# Patient Record
Sex: FEMALE | Race: WHITE | NOT HISPANIC OR LATINO | Employment: OTHER | URBAN - METROPOLITAN AREA
[De-identification: names, ages, dates, MRNs, and addresses within clinical notes are randomized per-mention and may not be internally consistent; named-entity substitution may affect disease eponyms.]

---

## 2024-06-13 ENCOUNTER — HOSPITAL ENCOUNTER (EMERGENCY)
Facility: HOSPITAL | Age: 84
Discharge: HOME | End: 2024-06-13
Payer: MEDICARE

## 2024-06-13 ENCOUNTER — OFFICE VISIT (OUTPATIENT)
Dept: ORTHOPEDIC SURGERY | Facility: HOSPITAL | Age: 84
End: 2024-06-13
Payer: MEDICARE

## 2024-06-13 ENCOUNTER — HOSPITAL ENCOUNTER (OUTPATIENT)
Dept: RADIOLOGY | Facility: HOSPITAL | Age: 84
Discharge: HOME | End: 2024-06-13
Payer: MEDICARE

## 2024-06-13 ENCOUNTER — APPOINTMENT (OUTPATIENT)
Dept: RADIOLOGY | Facility: HOSPITAL | Age: 84
End: 2024-06-13
Payer: MEDICARE

## 2024-06-13 VITALS
DIASTOLIC BLOOD PRESSURE: 78 MMHG | RESPIRATION RATE: 16 BRPM | HEIGHT: 63 IN | TEMPERATURE: 97.7 F | WEIGHT: 138 LBS | SYSTOLIC BLOOD PRESSURE: 172 MMHG | BODY MASS INDEX: 24.45 KG/M2 | HEART RATE: 70 BPM | OXYGEN SATURATION: 98 %

## 2024-06-13 DIAGNOSIS — S52.501A CLOSED FRACTURE OF DISTAL END OF RIGHT RADIUS, UNSPECIFIED FRACTURE MORPHOLOGY, INITIAL ENCOUNTER: Primary | ICD-10-CM

## 2024-06-13 DIAGNOSIS — M79.671 RIGHT FOOT PAIN: ICD-10-CM

## 2024-06-13 DIAGNOSIS — M25.531 RIGHT WRIST PAIN: ICD-10-CM

## 2024-06-13 DIAGNOSIS — S92.354A NONDISPLACED FRACTURE OF FIFTH METATARSAL BONE, RIGHT FOOT, INITIAL ENCOUNTER FOR CLOSED FRACTURE: ICD-10-CM

## 2024-06-13 DIAGNOSIS — S52.501A CLOSED TRAUMATIC DISPLACED FRACTURE OF DISTAL END OF RIGHT RADIUS, INITIAL ENCOUNTER: Primary | ICD-10-CM

## 2024-06-13 PROCEDURE — 1125F AMNT PAIN NOTED PAIN PRSNT: CPT

## 2024-06-13 PROCEDURE — 99284 EMERGENCY DEPT VISIT MOD MDM: CPT | Mod: 25

## 2024-06-13 PROCEDURE — 2500000004 HC RX 250 GENERAL PHARMACY W/ HCPCS (ALT 636 FOR OP/ED): Performed by: PHYSICIAN ASSISTANT

## 2024-06-13 PROCEDURE — 25605 CLTX DST RDL FX/EPHYS SEP W/: CPT | Mod: RT

## 2024-06-13 PROCEDURE — 99285 EMERGENCY DEPT VISIT HI MDM: CPT | Performed by: PHYSICIAN ASSISTANT

## 2024-06-13 PROCEDURE — 96374 THER/PROPH/DIAG INJ IV PUSH: CPT | Mod: 59

## 2024-06-13 PROCEDURE — 99214 OFFICE O/P EST MOD 30 MIN: CPT

## 2024-06-13 PROCEDURE — 99204 OFFICE O/P NEW MOD 45 MIN: CPT

## 2024-06-13 PROCEDURE — L4361 PNEUMA/VAC WALK BOOT PRE OTS: HCPCS

## 2024-06-13 PROCEDURE — 73630 X-RAY EXAM OF FOOT: CPT | Mod: RIGHT SIDE | Performed by: RADIOLOGY

## 2024-06-13 PROCEDURE — 1160F RVW MEDS BY RX/DR IN RCRD: CPT

## 2024-06-13 PROCEDURE — 73110 X-RAY EXAM OF WRIST: CPT | Mod: RIGHT SIDE | Performed by: RADIOLOGY

## 2024-06-13 PROCEDURE — 1159F MED LIST DOCD IN RCRD: CPT

## 2024-06-13 PROCEDURE — 73110 X-RAY EXAM OF WRIST: CPT | Mod: RT

## 2024-06-13 PROCEDURE — 73630 X-RAY EXAM OF FOOT: CPT | Mod: RT

## 2024-06-13 PROCEDURE — 1123F ACP DISCUSS/DSCN MKR DOCD: CPT

## 2024-06-13 RX ORDER — MORPHINE SULFATE 4 MG/ML
4 INJECTION INTRAVENOUS ONCE
Status: COMPLETED | OUTPATIENT
Start: 2024-06-13 | End: 2024-06-13

## 2024-06-13 RX ORDER — INSULIN LISPRO 100 [IU]/ML
INJECTION, SOLUTION SUBCUTANEOUS 3 TIMES DAILY
COMMUNITY
Start: 2023-07-26

## 2024-06-13 RX ORDER — METOPROLOL SUCCINATE 50 MG/1
1 TABLET, EXTENDED RELEASE ORAL EVERY EVENING
COMMUNITY
Start: 2024-01-12

## 2024-06-13 RX ORDER — ROSUVASTATIN CALCIUM 10 MG/1
1 TABLET, COATED ORAL NIGHTLY
COMMUNITY
Start: 2024-05-14 | End: 2025-05-14

## 2024-06-13 RX ORDER — OXYCODONE AND ACETAMINOPHEN 5; 325 MG/1; MG/1
1 TABLET ORAL EVERY 8 HOURS PRN
Qty: 21 TABLET | Refills: 0 | Status: SHIPPED | OUTPATIENT
Start: 2024-06-13 | End: 2024-06-20

## 2024-06-13 RX ORDER — BISMUTH SUBSALICYLATE 262 MG
1 TABLET,CHEWABLE ORAL EVERY MORNING
COMMUNITY

## 2024-06-13 RX ORDER — LOSARTAN POTASSIUM 25 MG/1
1 TABLET ORAL EVERY MORNING
COMMUNITY
Start: 2024-01-12

## 2024-06-13 RX ORDER — CHOLECALCIFEROL (VITAMIN D3) 25 MCG
1-2 TABLET ORAL EVERY MORNING
COMMUNITY

## 2024-06-13 RX ORDER — INSULIN DEGLUDEC 100 U/ML
9 INJECTION, SOLUTION SUBCUTANEOUS EVERY MORNING
COMMUNITY
Start: 2023-11-28

## 2024-06-13 RX ORDER — MULTIVIT WITH MINERALS/HERBS
1 TABLET ORAL EVERY MORNING
COMMUNITY

## 2024-06-13 RX ORDER — LIDOCAINE HYDROCHLORIDE 10 MG/ML
10 INJECTION INFILTRATION; PERINEURAL ONCE
Status: DISCONTINUED | OUTPATIENT
Start: 2024-06-13 | End: 2024-06-13 | Stop reason: HOSPADM

## 2024-06-13 RX ADMIN — MORPHINE SULFATE 4 MG: 4 INJECTION, SOLUTION INTRAMUSCULAR; INTRAVENOUS at 18:54

## 2024-06-13 ASSESSMENT — COLUMBIA-SUICIDE SEVERITY RATING SCALE - C-SSRS
1. IN THE PAST MONTH, HAVE YOU WISHED YOU WERE DEAD OR WISHED YOU COULD GO TO SLEEP AND NOT WAKE UP?: NO
6. HAVE YOU EVER DONE ANYTHING, STARTED TO DO ANYTHING, OR PREPARED TO DO ANYTHING TO END YOUR LIFE?: NO
2. HAVE YOU ACTUALLY HAD ANY THOUGHTS OF KILLING YOURSELF?: NO

## 2024-06-13 ASSESSMENT — PAIN SCALES - GENERAL
PAINLEVEL_OUTOF10: 9
PAINLEVEL_OUTOF10: 9

## 2024-06-13 ASSESSMENT — PAIN - FUNCTIONAL ASSESSMENT
PAIN_FUNCTIONAL_ASSESSMENT: 0-10
PAIN_FUNCTIONAL_ASSESSMENT: 0-10

## 2024-06-13 NOTE — ED TRIAGE NOTES
Pt had a mechanical fall from standing and injured her R wrist and foot. Went to urgent care where fractures were confirmed. Has a walking boot on and is able to ambulate with a steady gait. Urgent care stated wrist needs to be reduced and they were unsuccessful in doing so. Pt is on eliquis, did not hit her head. Neuro intact

## 2024-06-13 NOTE — ED PROVIDER NOTES
HPI   Chief Complaint   Patient presents with    Fall       HPI: Patient is a 89-year-old female with a history of hypertension, hyperlipidemia, A-fib on Eliquis who presents to the ED for wrist and foot injury that occurred prior to arrival.  Patient states that she was on an escalator earlier today when she went to turn to look at her family member and she twisted onto her right side landing onto her right wrist and right foot.  She was seen at urgent care where she was found to have a nondisplaced ankle fracture and a displaced right wrist fracture.  They were unable to reduce the wrist in the urgent care so sent to the ED.  Patient denies any head injury, loss of consciousness.  Denies any neck or back pain.  Rates her wrist pain a 9 out of 10 in severity.    ------------------------------------------------------------------------------------------------------------------------------------------  ROS: a ten point review of systems was performed and was negative except as per HPI.  ------------------------------------------------------------------------------------------------------------------------------------------  PMH / PSH: as per HPI, otherwise reviewed   MEDS: as per HPI, otherwise reviewed in EMR  ALLERGIES: as per HPI, otherwise reviewed in EMR  SocH:  as per HPI, otherwise reviewed in EMR  FH:  as per HPI, otherwise reviewed in EMR   ------------------------------------------------------------------------------------------------------------------------------------------  Physical Exam:  VS: As documented in the triage note and EMR flowsheet from this visit was reviewed  General: Well appearing. No acute distress.   Eyes:  Extraocular movements grossly intact. No scleral icterus.   Head: Atraumatic. Normocephalic.   No Mcnally sign, no raccoon eyes.  No hemotympanum.  Neck: No meningismus. No gross masses. Full movement through range of motion  ENT: Posterior oropharynx shows no erythema, exudate or  edema.  Uvula is midline without edema.  No stridor or trismus  CV: Regular rhythm. No murmurs, rubs, gallops appreciated.   Resp: Clear to auscultation bilaterally. No respiratory distress.    MSK: Symmetric muscle bulk. No gross step offs or deformities.  No midline tenderness of the cervical, thoracic or lumbar spine.  Boot in place to the right foot.  Skin: Warm, dry. No rashes  Neuro: CN II-VII intact. A&O x3. Speech fluent. Alert. Moving all extremities. Ambulates with normal gait  Psych: Appropriate mood and affect for situation  ------------------------------------------------------------------------------------------------------------------------------------------  Hospital Course / Medical Decision Making: Patient is a 89-year-old female who presents to the ED for right ankle and right wrist fractures.  States that she was on an escalator and went to turn and fell landing on her right side.  She was seen in urgent care where she was found to have a displaced right wrist fracture and nondisplaced right ankle fracture.  She denies any head injury.  On examination she has no external signs of trauma or basilar skull fracture on examination.  No tenderness to the midline of the cervical, thoracic or lumbar spine.  Orthopedics consulted who came and reduced the patient's right wrist without difficulty.  Recommended discharging the patient with pain medication.  Patient is from [place] and will follow-up there. Patient has remained hemodynamically stable throughout the course of their ED stay.  Patient is home-going.  Patient advised to return to the ED for any worsening symptoms.  Advised to follow-up with PCP.  Patient was discharged in stable condition.                              [place] Coma Scale Score: 15                     Patient History   No past medical history on file.  Past Surgical History:   Procedure Laterality Date    CT ANGIO HEART CORONARY  11/26/2021    CT HEART CORONARY ANGIOGRAM  11/26/2021 [facility]     No family history on file.  Social History     Tobacco Use    Smoking status: Not on file    Smokeless tobacco: Not on file   Substance Use Topics    Alcohol use: Not on file    Drug use: Not on file       Physical Exam   ED Triage Vitals [06/13/24 1706]   Temperature Heart Rate Respirations BP   36.5 °C (97.7 °F) 70 16 172/78      Pulse Ox Temp src Heart Rate Source Patient Position   98 % -- -- --      BP Location FiO2 (%)     -- --       Physical Exam    ED Course & MDM   Diagnoses as of 06/13/24 2048   Closed fracture of distal end of right radius, unspecified fracture morphology, initial encounter       Medical Decision Making      Procedure  Procedures     Chiara Merritt PA-C  06/13/24 2049

## 2024-06-14 NOTE — CONSULTS
Orthopaedic Surgery Consult H&P    HPI:   Orthopaedic Problems/Injuries: Right distal radius fracture  Other Injuries: Right fifth metatarsal fracture    84 y.o. female PMH type 1 diabetes, A-fib on Eliquis presents after ground-level fall while getting off an escalator sustaining above. Denies numbness, tingling, and open wounds on the affected limb.  No significant pain and swelling about the right wrist.  Denies previous injuries to this extremity.    PMH: per above/EMR  PSH: per above/EMR  SocHx:      -  Denies tobacco use  FamHx:  Non-contributory to this patient's acute orthopaedic problem.   Allergies: Reviewed in EMR  Meds: Reviewed in EMR    ROS      - 14 point ROS negative except as above    Physical Exam:  Gen: AOx3, NAD  HEENT: normocephalic atraumatic  Psych: appropriate mood and affect  Resp: nonlabored breathing  Cardiac: Extremities WWP, RRR to peripheral palpation  Neuro: CN 2-12 grossly intact  Skin: no rashes    Right hand:  - Skin: intact  - Painful at site of injury  - SILT M/U/R  - RoM: Limited by pain  - Fires AIN/PIN/Ulnar distributions  - Fingertips pink/warm, cap refill < 2sec  - 2+ radial pulse  - Hand and Forearm compartments compressible    A full secondary exam was performed and all relevant findings discussed and noted above.    Imaging:  AP and lateral radiographs of the right wrist display a dorsally displaced intra-articular distal radius fracture with an associated ulnar styloid fracture.    Postreduction AP and lateral radiographs of the right wrist demonstrate improved alignment of the distal radius and ulnar styloid fractures with interval splinting.    Assessment:  Orthopaedic Problems/Injuries: Right distal radius fracture    We discussed natural history of these injuries as well as the treatment options including nonoperative treatment with splinting in situ versus closed reduction and splinting as well as operative intervention.  Recommendation was made for nonoperative  treatment and patient would like to undergo a closed reduction.  Verbal consent was obtained from the patient, he received 4 mg of IV morphine per the emergency department for pain control.  A 10 mL 1% lidocaine hematoma block for additional pain control was performed.  The right upper extremity was then closed reduced and placed in a sugar-tong splint.  She tolerated this well.    Plan:  -No acute orthopedic operative intervention indicated  - WB: Nonweightbearing right upper extremity and sugar-tong splint and sling  - Abx: Not indicated  - DVT: None indicated for this injury, may continue her home Eliquis  -Pain control per the emergency department    - Dispo: Home. Patient lives in [place] and plans to return home on 6/19. Recommended calling her local health system and arranging appointment with [facility] provider for about 2 weeks from today.    This plan was discussed with Dr. Coates.    Kel Trejo MD  PGY-4 Orthopaedic Surgery  On-call Resident    This patient was seen and staffed within 30 minutes of initial consult.

## 2024-06-16 NOTE — PROGRESS NOTES
PRIMARY CARE PHYSICIAN: Jarred Dove MD  REFERRING PROVIDER: No referring provider defined for this encounter.     CONSULT ORTHOPAEDIC: Hand and Wrist Evaluation    ASSESSMENT & PLAN    Impression: 84 y.o. female with an acute ground-level fall resulting in a displaced right distal radius fracture as well as a nondisplaced comminuted midshaft fifth metatarsal fracture of the right foot    Plan:   I explained to the patient the nature of their diagnosis.  I reviewed their imaging studies with them.    Discussed treatment options with patient and her son today in regards to management of patient's displaced distal radius fracture including splinting and referral to hand/wrist specialist for definitive care versus transfer to [facility] ED (after discussion with Dr. Coates), for closed reduction and splinting today. Patient and son elected to go forward with transfer to [facility] ED for closed reduction of the displaced distal radius fracture. Patient was placed on Dr. Coates's schedule for Monday for close follow-up.     With regard to the fifth metatarsal fracture, the patient was placed in a short fracture boot which she may be ambulating weight bearing as tolerated.     Patient was prescribed a CAM walker boot for nondisplaced comminuted fifth metatarsal fracture. The patient is ambulatory with or without aid; but, has weakness, instability and/or deformity of their right ankle/foot which requires stabilization from this orthosis to improve their function.      Verbal and written instructions for the use, wear schedule, cleaning and application of this item were given.  Patient was instructed that should the brace result in increased pain, decreased sensation, increased swelling, or an overall worsening of their medical condition, to please contact our office immediately.     Orthotic management and training was provided for skin care, modifications due to healing tissues, edema changes, interruption in skin  integrity, and safety precautions with the orthosis.      At the end of the visit, all questions were answered in full. The patient is in agreement with the plan and recommendations. They will call the office with any questions/concerns.    Note dictated with Flexiroam software. Completed without full typed error editing and sent to avoid delay.     SUBJECTIVE  CHIEF COMPLAINT:   Chief Complaint   Patient presents with    Right Wrist - Pain        HPI: Valentina Cole is a 84 y.o. patient who presents today to the orthopedic walk-in clinic with right wrist and foot pain. Pain started approximately one hour ago when she fell while stepping off an escalator. Patient reports she turned the wrong way while exiting the escalator and subsequently fell onto the right wrist and foot. Pain is localized to the distal radius and ulnar styloid as well as the fifth metatarsal of the right foot. Pain is rated a 9/10 on the pain scale.  Patient is accompanied by her son.     They deny any associated neck pain.  No numbness, tingling, or paresthesias.    REVIEW OF SYSTEMS  Constitutional: See HPI for pain assessment, No significant recent weight gain or loss, no fever or chills  Cardiovascular: No chest pain, shortness of breath  Respiratory: No difficulty breathing, no cough  Gastrointestinal: No nausea, vomiting, diarrhea, constipation  Musculoskeletal: Noted in HPI, positive for pain, restricted motion, stiffness  Integumentary: No rashes, easy bruising or skin lesions  Neurological: No headache, no numbness or tingling in extremities  Psychiatric: No mood changes or memory changes. No social issues  Heme/Lymph: No excessive swelling, easy bruising or excessive bleeding  ENT: No hearing changes, no nosebleeds  Eyes: No vision changes    No past medical history on file.     Allergies   Allergen Reactions    Ciprofloxacin Hcl Anaphylaxis and Swelling     leg swelling    Sulfa (Sulfonamide Antibiotics) Other         Past Surgical History:   Procedure Laterality Date    CT ANGIO HEART CORONARY  11/26/2021    CT HEART CORONARY ANGIOGRAM 11/26/2021 [facility] ANCILLARY LEGAC[facility]        No family history on file.     Social History     Socioeconomic History    Marital status:      Spouse name: Not on file    Number of children: Not on file    Years of education: Not on file    Highest education level: Not on file   Occupational History    Not on file   Tobacco Use    Smoking status: Not on file    Smokeless tobacco: Not on file   Substance and Sexual Activity    Alcohol use: Not on file    Drug use: Not on file    Sexual activity: Not on file   Other Topics Concern    Not on file   Social History Narrative    Not on file     Social Determinants of Health     Financial Resource Strain: Not on file   Food Insecurity: Not on file   Transportation Needs: Not on file   Physical Activity: Not on file   Stress: Not on file   Social Connections: Not on file   Intimate Partner Violence: Unknown (12/4/2023)    Received from [facility] [place]    OB Intimate Partner Violence     : 1     : 1     : Not on file   Housing Stability: Not At Risk (12/4/2023)    Received from [facility] [place]    OB Housing Stability     Fear of Current or Ex-Partner: Not on file     Emotionally Abused: Not on file     Physically Abused: Not on file     Sexually Abused: Not on file        CURRENT MEDICATIONS:   Current Outpatient Medications   Medication Sig Dispense Refill    apixaban (Eliquis) 2.5 mg tablet Take 1 tablet (2.5 mg) by mouth 2 times a day.      cholecalciferol (Vitamin D-3) 25 MCG (1000 UT) tablet Take 1-2 tablets (25-50 mcg) by mouth once daily in the morning.      dronedarone (Multaq) 400 mg tablet Take 1 tablet (400 mg) by mouth 2 times a day.      HumaLOG Tonny KwikPen U-100 100 unit/mL injection Inject under the skin 3 times a day. Per sliding scale      losartan (Cozaar) 25 mg tablet Take 1 tablet (25 mg) by mouth once  "daily in the morning.      metoprolol succinate XL (Toprol-XL) 50 mg 24 hr tablet Take 1 tablet (50 mg) by mouth once daily in the evening.      multivitamin tablet Take 1 tablet by mouth once daily in the morning.      oxyCODONE-acetaminophen (Percocet) 5-325 mg tablet Take 1 tablet by mouth every 8 hours if needed for severe pain (7 - 10) for up to 7 days. 21 tablet 0    rosuvastatin (Crestor) 10 mg tablet Take 1 tablet (10 mg) by mouth once daily at bedtime.      Tresiba FlexTouch U-100 100 unit/mL (3 mL) injection Inject 9 Units under the skin once daily in the morning.      vitamin B complex tablet Take 1 tablet by mouth once daily in the morning.       No current facility-administered medications for this visit.        OBJECTIVE    PHYSICAL EXAM      11/23/2021    10:28 AM 6/13/2024     5:06 PM   Vitals   Systolic 165 172   Diastolic 63 78   Heart Rate 63 70   Temp  36.5 °C (97.7 °F)   Resp  16   Height (in)  1.6 m (5' 3\")   Weight (lb) 133.44 138   BMI  24.45 kg/m2   BSA (m2)  1.67 m2   Visit Report  Report      There is no height or weight on file to calculate BMI.    GENERAL: A/Ox3, NAD. Appears healthy, well nourished  PSYCHIATRIC: Mood stable, appropriate memory recall  EYES: EOM intact, no scleral icterus  CARDIOVASCULAR: Palpable peripheral pulses  LUNGS: Breathing non-labored on room air  SKIN: No open lesions, rashes, ulcerations    MUSCULOSKELETAL:  Laterality: right Hand and Wrist Exam  - Symmetric shoulder and elbow ROM  - Skin intact  - No erythema or warmth  - No ecchymosis or soft tissue swelling  - Wrist ROM:  limited due to pain  - Strength: deferred  - Palpation: positive TTP distal radius and ulnar styloid   - Able to make a loose fist    Laterality: right Foot Exam  - Skin intact  - No erythema or warmth  - No ecchymosis or soft tissue swelling  - Alignment: neutral  - Palpation: TTP 5th metatarsal  - ROM: Normal ROM in ankle plantar flexion, dorsiflexion, inversion and eversion  - " Strength: deferred due to pain  - Gait: antalgic    NEUROVASCULAR:  - Neurovascular Status: sensation intact to light touch distally, upper and lower extremity motor grossly intact  - Capillary refill brisk at extremities, Bilateral peripheral pulses 2+    Imaging: Multiple views of the affected right hand/wrist(s) demonstrate: comminuted dorsally displaced distal radius fracture with associated ulnar styloid fracture.   X-rays were personally reviewed and interpreted by me.  Radiology reports were reviewed by me as well, if readily available at the time.    Imaging: Multiple views of the affected right ankle(s) demonstrate: nondisplaced comminuted midshaft fifth metatarsal fracture.   X-rays were personally reviewed and interpreted by me.  Radiology reports were reviewed by me as well, if readily available at the time.

## 2024-06-17 ENCOUNTER — OFFICE VISIT (OUTPATIENT)
Dept: ORTHOPEDIC SURGERY | Facility: HOSPITAL | Age: 84
End: 2024-06-17
Payer: MEDICARE

## 2024-06-17 DIAGNOSIS — M25.531 RIGHT WRIST PAIN: Primary | ICD-10-CM

## 2024-06-17 PROCEDURE — 99213 OFFICE O/P EST LOW 20 MIN: CPT | Performed by: STUDENT IN AN ORGANIZED HEALTH CARE EDUCATION/TRAINING PROGRAM

## 2024-06-17 PROCEDURE — 1123F ACP DISCUSS/DSCN MKR DOCD: CPT | Performed by: STUDENT IN AN ORGANIZED HEALTH CARE EDUCATION/TRAINING PROGRAM

## 2024-06-17 PROCEDURE — 1159F MED LIST DOCD IN RCRD: CPT | Performed by: STUDENT IN AN ORGANIZED HEALTH CARE EDUCATION/TRAINING PROGRAM

## 2024-06-17 ASSESSMENT — PAIN SCALES - GENERAL: PAINLEVEL_OUTOF10: 5 - MODERATE PAIN

## 2024-06-17 ASSESSMENT — PAIN DESCRIPTION - DESCRIPTORS: DESCRIPTORS: ACHING

## 2024-06-17 ASSESSMENT — PAIN - FUNCTIONAL ASSESSMENT: PAIN_FUNCTIONAL_ASSESSMENT: 0-10

## 2024-06-17 NOTE — PROGRESS NOTES
[facility]   Hand and Upper Extremity Service  Initial evaluation / Consultation        Consult requested by Referring Physician: Emergency department     Chief Complaint: Right distal radius fracture     Patient is a pleasant 84-year-old female who sustained a right distal radius fracture on 6/13/2024.  She sustained a ground-level fall injuring her right foot and right wrist.  She had immediate pain.  She went to the injury clinic and was subsequently went to the emergency department to have a closed reduction and splint performed by the resident on-call.  She notes some swelling to her fingers and ecchymosis.  She denies numbness and tingling.  She is planning on returning to [place] on Wednesday.  She currently lives in [place] but was visiting her son in [place].  Her son is a physician at [employer].        Please refer to New Patient Intake Form scanned into patient's electronic record for self reported past medical history, past surgical history, medications, allergies, family history, social history and 10 point review of systems     Examination:  Constitutional: Oriented to person, place, and time.  Appears well-developed and well-nourished.  Head: Normocephalic and atraumatic.  Eyes: Pupils are equal, round, and reactive to light.  Cardiovascular: Intact distal pulses.   Pulmonary/Chest/Breast: Effort normal. No respiratory distress.  Neurological: Alert and oriented to person, place, and time.  Skin: Skin is warm and dry.  Psychiatric: normal mood and affect.  Behavior is normal.  Musculoskeletal: Right wrist  Patient is in a well-padded sugar-tong splint.  This ends just proximal to the metacarpal heads.  She is able to flex and extend the MP and IP joints.  She is able to flex and extend the MP and IP joints.  There is significant swelling to her fingers with ecchymosis throughout.  She has sensation intact to light touch to radial, ulnar, median nerves.   AIN, PIN, ulnar motor nerves intact.  Brisk capillary refill       Personal Interpretation of Diagnostic studies: XR of the right wrist reviewed from 6/13/2024  There is a extra-articular distal radius fracture with a reduction performed with near-anatomic alignment.    XR of the right foot taken 6/13/2024 was reviewed in office today  There is 5th metatarsal shaft fracture.         Impression: Right distal radius fracture and right fifth metatarsal shaft fracture         In Office Procedures Performed: None         Medical Decision Making:   We discussed that this injury can be treated nonoperatively.  We discussed that she should continue to elevate her hand above the level of her heart.  We discussed the expected outcome of these fractures.  She is returning to [place] on Wednesday.  This is where she lives.  She will follow-up with an orthopedic surgeon there.    In regards to her foot she should maintain the boot.  She may take this off for hygiene purposes.  She will follow-up with an orthopedic surgeon in [place].         Medications Prescribed: None        Follow up: As needed.  The patient is planning to follow-up with the orthopedic surgeon near her home in [place].           Ralph Coates DO  [facility]  Department of Orthopaedic Surgery  Hand and Upper Extremity Reconstruction           Dictation performed with the use of voice recognition software.  Syntax and grammatical errors may exist.